# Patient Record
Sex: MALE | Race: WHITE
[De-identification: names, ages, dates, MRNs, and addresses within clinical notes are randomized per-mention and may not be internally consistent; named-entity substitution may affect disease eponyms.]

---

## 2024-05-14 PROBLEM — Z00.00 ENCOUNTER FOR PREVENTIVE HEALTH EXAMINATION: Status: ACTIVE | Noted: 2024-05-14

## 2024-05-16 ENCOUNTER — APPOINTMENT (OUTPATIENT)
Dept: UROLOGY | Facility: CLINIC | Age: 34
End: 2024-05-16
Payer: SELF-PAY

## 2024-05-16 VITALS
DIASTOLIC BLOOD PRESSURE: 64 MMHG | WEIGHT: 190 LBS | SYSTOLIC BLOOD PRESSURE: 116 MMHG | BODY MASS INDEX: 23.62 KG/M2 | HEIGHT: 75 IN | TEMPERATURE: 97.7 F

## 2024-05-16 DIAGNOSIS — N48.89 OTHER SPECIFIED DISORDERS OF PENIS: ICD-10-CM

## 2024-05-16 DIAGNOSIS — N48.6 INDURATION PENIS PLASTICA: ICD-10-CM

## 2024-05-16 DIAGNOSIS — N48.30 PRIAPISM, UNSPECIFIED: ICD-10-CM

## 2024-05-16 DIAGNOSIS — Z78.9 OTHER SPECIFIED HEALTH STATUS: ICD-10-CM

## 2024-05-16 DIAGNOSIS — S39.94XA UNSPECIFIED INJURY OF EXTERNAL GENITALS, INITIAL ENCOUNTER: ICD-10-CM

## 2024-05-16 DIAGNOSIS — E05.90 THYROTOXICOSIS, UNSPECIFIED W/OUT THYROTOXIC CRISIS OR STORM: ICD-10-CM

## 2024-05-16 DIAGNOSIS — S39.840S FRACTURE OF CORPUS CAVERNOSUM PENIS, SEQUELA: ICD-10-CM

## 2024-05-16 DIAGNOSIS — F41.0 PANIC DISORDER [EPISODIC PAROXYSMAL ANXIETY]: ICD-10-CM

## 2024-05-16 PROCEDURE — 99204 OFFICE O/P NEW MOD 45 MIN: CPT

## 2024-05-16 RX ORDER — METHIMAZOLE 5 MG/1
TABLET ORAL
Refills: 0 | Status: ACTIVE | COMMUNITY

## 2024-05-19 PROBLEM — S39.94XA: Status: ACTIVE | Noted: 2024-05-16

## 2024-05-19 PROBLEM — N48.89 PENILE CURVATURE, ACQUIRED: Status: ACTIVE | Noted: 2024-05-19

## 2024-05-19 PROBLEM — N48.6 PEYRONIE'S DISEASE: Status: ACTIVE | Noted: 2024-05-16

## 2024-05-19 PROBLEM — N48.30 PAINFUL PENILE ERECTION: Status: ACTIVE | Noted: 2024-05-19

## 2024-05-19 PROBLEM — S39.840S PENILE FRACTURE, SEQUELA: Status: ACTIVE | Noted: 2024-05-19

## 2024-05-19 NOTE — PHYSICAL EXAM
[General Appearance - Well Developed] : well developed [General Appearance - Well Nourished] : well nourished [Heart Rate And Rhythm] : heart rate and rhythm were normal [] : no respiratory distress [Respiration, Rhythm And Depth] : normal respiratory rhythm and effort [Bowel Sounds] : normal bowel sounds [Abdomen Soft] : soft [Scrotum] : the scrotum was normal [Rectal Exam - Seminal Vesicles] : the seminal vesicles were normal [Epididymis] : the epididymides were normal [Testes Mass (___cm)] : there were no testicular masses [Testes Tenderness] : no tenderness of the testes [TextEntry] : NL uncircumcised penis. 3cm x 5cm induration in mid shaft, more pronounced on dorsum than on ventral. Slightly tender

## 2024-05-19 NOTE — PLAN
[TextEntry] : A/P, 34M w/ penile trauma - Watch and monitor - Trial of Cialis, 2.5 mg daily. Pt will create account w/ [employer] - sched f/u in 2 months  -Consider obtaining RestorX stretching device

## 2024-05-19 NOTE — HISTORY OF PRESENT ILLNESS
[FreeTextEntry1] : 34M w/ penile trauma and Hx of hypothyroidism Had been having sex 2 mo ago w/ female partner, she was on top, and felt a sharp pain (no sound heard) Did not go to ER, pain improved slowly w/ time Can now achieve firm erections just fine. Has not had sex since. Reports new upward bend of 45 degrees TAVON score of 24